# Patient Record
Sex: FEMALE | Race: WHITE | URBAN - NONMETROPOLITAN AREA
[De-identification: names, ages, dates, MRNs, and addresses within clinical notes are randomized per-mention and may not be internally consistent; named-entity substitution may affect disease eponyms.]

---

## 2017-11-29 ENCOUNTER — DOCTOR'S OFFICE (OUTPATIENT)
Dept: URBAN - NONMETROPOLITAN AREA CLINIC 1 | Facility: CLINIC | Age: 62
Setting detail: OPHTHALMOLOGY
End: 2017-11-29
Payer: COMMERCIAL

## 2017-11-29 DIAGNOSIS — E11.9: ICD-10-CM

## 2017-11-29 DIAGNOSIS — H25.013: ICD-10-CM

## 2017-11-29 PROCEDURE — 92014 COMPRE OPH EXAM EST PT 1/>: CPT | Performed by: OPHTHALMOLOGY

## 2017-11-29 PROCEDURE — 92134 CPTRZ OPH DX IMG PST SGM RTA: CPT | Performed by: OPHTHALMOLOGY

## 2017-11-29 ASSESSMENT — REFRACTION_AUTOREFRACTION
OS_CYLINDER: -1.00
OD_CYLINDER: -0.75
OS_SPHERE: +1.25
OS_AXIS: 178
OD_AXIS: 80
OD_SPHERE: +1.50

## 2017-11-29 ASSESSMENT — REFRACTION_CURRENTRX
OS_ADD: +2.00
OS_VPRISM_DIRECTION: BF
OS_CYLINDER: -0.50
OD_VPRISM_DIRECTION: BF
OD_ADD: +2.00
OD_OVR_VA: 20/
OS_OVR_VA: 20/
OS_AXIS: 179
OD_CYLINDER: 0.00
OD_SPHERE: +0.75
OS_SPHERE: +1.00
OD_AXIS: 180
OS_OVR_VA: 20/
OD_OVR_VA: 20/
OD_OVR_VA: 20/
OS_OVR_VA: 20/

## 2017-11-29 ASSESSMENT — VISUAL ACUITY
OD_BCVA: 20/25+2
OS_BCVA: 20/25+1

## 2017-11-29 ASSESSMENT — REFRACTION_MANIFEST
OU_VA: 20/
OD_VA2: 20/
OS_VA1: 20/
OS_VA3: 20/
OD_VA1: 20/
OS_VA3: 20/
OS_VA2: 20/
OD_VA2: 20/
OD_VA3: 20/
OD_VA1: 20/
OS_VA1: 20/
OD_VA2: 20/
OS_VA1: 20/
OU_VA: 20/
OD_VA3: 20/
OD_VA3: 20/
OS_VA2: 20/
OU_VA: 20/
OD_VA1: 20/
OS_VA2: 20/
OS_VA3: 20/

## 2017-11-29 ASSESSMENT — CONFRONTATIONAL VISUAL FIELD TEST (CVF)
OD_FINDINGS: FULL
OS_FINDINGS: FULL

## 2017-11-29 ASSESSMENT — SPHEQUIV_DERIVED
OS_SPHEQUIV: 0.75
OD_SPHEQUIV: 1.125

## 2019-02-06 ENCOUNTER — DOCTOR'S OFFICE (OUTPATIENT)
Dept: URBAN - NONMETROPOLITAN AREA CLINIC 1 | Facility: CLINIC | Age: 64
Setting detail: OPHTHALMOLOGY
End: 2019-02-06
Payer: COMMERCIAL

## 2019-02-06 DIAGNOSIS — E11.3293: ICD-10-CM

## 2019-02-06 DIAGNOSIS — H25.013: ICD-10-CM

## 2019-02-06 DIAGNOSIS — F17.200: ICD-10-CM

## 2019-02-06 PROCEDURE — 92134 CPTRZ OPH DX IMG PST SGM RTA: CPT | Performed by: OPHTHALMOLOGY

## 2019-02-06 PROCEDURE — 92012 INTRM OPH EXAM EST PATIENT: CPT | Performed by: OPHTHALMOLOGY

## 2019-02-06 ASSESSMENT — REFRACTION_MANIFEST
OS_VA1: 20/
OS_VA3: 20/
OD_VA2: 20/
OD_VA2: 20/
OU_VA: 20/
OU_VA: 20/
OS_VA2: 20/
OD_VA3: 20/
OD_VA1: 20/
OD_VA3: 20/
OS_VA2: 20/
OS_VA1: 20/
OD_VA1: 20/
OS_VA3: 20/

## 2019-02-06 ASSESSMENT — REFRACTION_CURRENTRX
OD_AXIS: 180
OS_SPHERE: +1.00
OD_OVR_VA: 20/
OS_AXIS: 002
OD_CYLINDER: 0.00
OD_OVR_VA: 20/
OS_CYLINDER: -0.50
OD_OVR_VA: 20/
OS_OVR_VA: 20/
OS_OVR_VA: 20/
OD_VPRISM_DIRECTION: BF
OS_ADD: +2.00
OD_SPHERE: +0.75
OS_OVR_VA: 20/
OS_VPRISM_DIRECTION: BF
OD_ADD: +2.00

## 2019-02-06 ASSESSMENT — VISUAL ACUITY
OD_BCVA: 20/25-1
OS_BCVA: 20/30-2

## 2019-02-06 ASSESSMENT — SPHEQUIV_DERIVED
OD_SPHEQUIV: 1.5
OS_SPHEQUIV: 0.75

## 2019-02-06 ASSESSMENT — REFRACTION_AUTOREFRACTION
OD_AXIS: 075
OD_SPHERE: +2.00
OS_AXIS: 179
OD_CYLINDER: -1.00
OS_SPHERE: +1.25
OS_CYLINDER: -1.00

## 2020-02-19 ENCOUNTER — DOCTOR'S OFFICE (OUTPATIENT)
Dept: URBAN - NONMETROPOLITAN AREA CLINIC 1 | Facility: CLINIC | Age: 65
Setting detail: OPHTHALMOLOGY
End: 2020-02-19
Payer: COMMERCIAL

## 2020-02-19 VITALS — HEIGHT: 55 IN

## 2020-02-19 DIAGNOSIS — H25.13: ICD-10-CM

## 2020-02-19 DIAGNOSIS — F17.200: ICD-10-CM

## 2020-02-19 DIAGNOSIS — H25.013: ICD-10-CM

## 2020-02-19 DIAGNOSIS — E11.9: ICD-10-CM

## 2020-02-19 PROCEDURE — 92014 COMPRE OPH EXAM EST PT 1/>: CPT | Performed by: OPHTHALMOLOGY

## 2020-02-19 ASSESSMENT — REFRACTION_CURRENTRX
OD_SPHERE: +0.75
OS_ADD: +2.00
OD_ADD: +1.75
OD_OVR_VA: 20/
OD_VPRISM_DIRECTION: BF
OS_AXIS: 177
OS_SPHERE: +1.00
OD_CYLINDER: 0.00
OS_CYLINDER: -0.50
OS_VPRISM_DIRECTION: BF
OS_OVR_VA: 20/
OD_AXIS: 180

## 2020-02-19 ASSESSMENT — REFRACTION_AUTOREFRACTION
OD_SPHERE: +2.50
OS_SPHERE: +0.75
OD_AXIS: 082
OD_CYLINDER: -1.75
OS_AXIS: 013
OS_CYLINDER: -0.50

## 2020-02-19 ASSESSMENT — VISUAL ACUITY
OS_BCVA: 20/25+2
OD_BCVA: 20/30+2

## 2020-02-19 ASSESSMENT — SPHEQUIV_DERIVED
OS_SPHEQUIV: 0.5
OD_SPHEQUIV: 1.625

## 2020-02-19 ASSESSMENT — CONFRONTATIONAL VISUAL FIELD TEST (CVF)
OS_FINDINGS: FULL
OD_FINDINGS: FULL

## 2021-08-13 ENCOUNTER — DOCTOR'S OFFICE (OUTPATIENT)
Dept: URBAN - NONMETROPOLITAN AREA CLINIC 1 | Facility: CLINIC | Age: 66
Setting detail: OPHTHALMOLOGY
End: 2021-08-13
Payer: COMMERCIAL

## 2021-08-13 ENCOUNTER — RX ONLY (RX ONLY)
Age: 66
End: 2021-08-13

## 2021-08-13 DIAGNOSIS — H25.013: ICD-10-CM

## 2021-08-13 DIAGNOSIS — E11.9: ICD-10-CM

## 2021-08-13 DIAGNOSIS — H25.13: ICD-10-CM

## 2021-08-13 DIAGNOSIS — F17.200: ICD-10-CM

## 2021-08-13 DIAGNOSIS — Z79.84: ICD-10-CM

## 2021-08-13 DIAGNOSIS — E11.3293: ICD-10-CM

## 2021-08-13 PROCEDURE — 92014 COMPRE OPH EXAM EST PT 1/>: CPT | Performed by: OPHTHALMOLOGY

## 2021-08-13 PROCEDURE — 92134 CPTRZ OPH DX IMG PST SGM RTA: CPT | Performed by: OPHTHALMOLOGY

## 2021-08-13 ASSESSMENT — REFRACTION_CURRENTRX
OS_AXIS: 177
OD_ADD: +1.75
OD_SPHERE: +0.75
OS_VPRISM_DIRECTION: BF
OD_VPRISM_DIRECTION: BF
OS_CYLINDER: -0.50
OS_SPHERE: +1.00
OS_ADD: +2.00
OS_OVR_VA: 20/
OD_CYLINDER: 0.00
OD_OVR_VA: 20/

## 2021-08-13 ASSESSMENT — CONFRONTATIONAL VISUAL FIELD TEST (CVF)
OD_FINDINGS: FULL
OS_FINDINGS: FULL

## 2021-08-13 ASSESSMENT — REFRACTION_AUTOREFRACTION
OS_SPHERE: +0.75
OS_CYLINDER: -0.50
OD_CYLINDER: -1.75
OS_AXIS: 013
OD_AXIS: 082
OD_SPHERE: +2.50

## 2021-08-13 ASSESSMENT — SPHEQUIV_DERIVED
OD_SPHEQUIV: 1.625
OS_SPHEQUIV: 0.5

## 2021-08-13 ASSESSMENT — VISUAL ACUITY
OS_BCVA: 20/25+2
OD_BCVA: 20/30-2

## 2021-12-28 DIAGNOSIS — Z11.59 SCREENING FOR VIRAL DISEASE: Primary | ICD-10-CM

## 2022-10-28 ENCOUNTER — DOCTOR'S OFFICE (OUTPATIENT)
Dept: URBAN - NONMETROPOLITAN AREA CLINIC 1 | Facility: CLINIC | Age: 67
Setting detail: OPHTHALMOLOGY
End: 2022-10-28
Payer: COMMERCIAL

## 2022-10-28 VITALS — HEIGHT: 55 IN

## 2022-10-28 DIAGNOSIS — E11.3293: ICD-10-CM

## 2022-10-28 DIAGNOSIS — H35.373: ICD-10-CM

## 2022-10-28 DIAGNOSIS — H25.013: ICD-10-CM

## 2022-10-28 DIAGNOSIS — Z79.84: ICD-10-CM

## 2022-10-28 DIAGNOSIS — H25.13: ICD-10-CM

## 2022-10-28 PROCEDURE — 92014 COMPRE OPH EXAM EST PT 1/>: CPT | Performed by: OPHTHALMOLOGY

## 2022-10-28 PROCEDURE — 92134 CPTRZ OPH DX IMG PST SGM RTA: CPT | Performed by: OPHTHALMOLOGY

## 2022-10-28 ASSESSMENT — REFRACTION_CURRENTRX
OS_OVR_VA: 20/
OD_OVR_VA: 20/
OS_ADD: +2.00
OD_SPHERE: +0.75
OS_AXIS: 177
OD_ADD: +1.75
OS_CYLINDER: -0.50
OS_SPHERE: +1.00
OS_VPRISM_DIRECTION: BF
OD_VPRISM_DIRECTION: BF
OD_CYLINDER: 0.00

## 2022-10-28 ASSESSMENT — REFRACTION_AUTOREFRACTION
OD_AXIS: 087
OS_SPHERE: +0.25
OD_SPHERE: +-1.50
OS_CYLINDER: 0.00
OS_AXIS: 000
OD_CYLINDER: -1.00

## 2022-10-28 ASSESSMENT — VISUAL ACUITY
OD_BCVA: 20/25-2
OS_BCVA: 20/20

## 2022-10-28 ASSESSMENT — SPHEQUIV_DERIVED: OS_SPHEQUIV: 0.25

## 2022-10-28 ASSESSMENT — CONFRONTATIONAL VISUAL FIELD TEST (CVF)
OS_FINDINGS: FULL
OD_FINDINGS: FULL

## 2023-10-11 ENCOUNTER — DOCTOR'S OFFICE (OUTPATIENT)
Dept: URBAN - NONMETROPOLITAN AREA CLINIC 1 | Facility: CLINIC | Age: 68
Setting detail: OPHTHALMOLOGY
End: 2023-10-11
Payer: COMMERCIAL

## 2023-10-11 DIAGNOSIS — E11.3293: ICD-10-CM

## 2023-10-11 DIAGNOSIS — E05.00: ICD-10-CM

## 2023-10-11 DIAGNOSIS — H25.013: ICD-10-CM

## 2023-10-11 DIAGNOSIS — H35.373: ICD-10-CM

## 2023-10-11 DIAGNOSIS — H50.10: ICD-10-CM

## 2023-10-11 DIAGNOSIS — H25.13: ICD-10-CM

## 2023-10-11 DIAGNOSIS — Z79.84: ICD-10-CM

## 2023-10-11 PROCEDURE — 92134 CPTRZ OPH DX IMG PST SGM RTA: CPT | Performed by: OPHTHALMOLOGY

## 2023-10-11 PROCEDURE — 99214 OFFICE O/P EST MOD 30 MIN: CPT | Performed by: OPHTHALMOLOGY

## 2023-10-11 ASSESSMENT — REFRACTION_CURRENTRX
OS_ADD: +2.00
OS_SPHERE: +1.00
OS_OVR_VA: 20/
OD_VPRISM_DIRECTION: BF
OD_OVR_VA: 20/
OS_CYLINDER: -0.50
OD_ADD: +1.75
OS_AXIS: 177
OS_VPRISM_DIRECTION: BF
OD_CYLINDER: 0.00
OD_SPHERE: +0.75

## 2023-10-11 ASSESSMENT — VISUAL ACUITY
OD_BCVA: 20/30
OS_BCVA: 20/25-1

## 2023-10-11 ASSESSMENT — SPHEQUIV_DERIVED: OS_SPHEQUIV: 0.25

## 2023-10-11 ASSESSMENT — REFRACTION_AUTOREFRACTION
OD_SPHERE: +-1.50
OS_CYLINDER: 0.00
OD_AXIS: 087
OS_AXIS: 000
OD_CYLINDER: -1.00
OS_SPHERE: +0.25

## 2023-10-11 ASSESSMENT — CONFRONTATIONAL VISUAL FIELD TEST (CVF)
OS_FINDINGS: FULL
OD_FINDINGS: FULL

## 2023-10-13 ENCOUNTER — DOCTOR'S OFFICE (OUTPATIENT)
Dept: URBAN - NONMETROPOLITAN AREA CLINIC 1 | Facility: CLINIC | Age: 68
Setting detail: OPHTHALMOLOGY
End: 2023-10-13
Payer: COMMERCIAL

## 2023-10-13 DIAGNOSIS — E05.00: ICD-10-CM

## 2023-10-13 PROBLEM — H50.10 EXOTROPIA, UNSPECIFIED: Status: ACTIVE | Noted: 2023-10-11

## 2023-10-13 PROCEDURE — NO CHARGE N/C PROFESSIONAL COURTESY: Performed by: OPHTHALMOLOGY

## 2023-10-13 ASSESSMENT — VISUAL ACUITY
OS_BCVA: 20/25-1
OD_BCVA: 20/30

## 2023-10-13 ASSESSMENT — REFRACTION_AUTOREFRACTION
OS_AXIS: 000
OS_SPHERE: +0.25
OS_CYLINDER: 0.00
OD_CYLINDER: -1.00
OD_SPHERE: +-1.50
OD_AXIS: 087

## 2023-10-13 ASSESSMENT — REFRACTION_CURRENTRX
OS_AXIS: 177
OD_VPRISM_DIRECTION: BF
OS_OVR_VA: 20/
OD_CYLINDER: 0.00
OS_VPRISM_DIRECTION: BF
OS_ADD: +2.00
OD_OVR_VA: 20/
OD_SPHERE: +0.75
OS_SPHERE: +1.00
OS_CYLINDER: -0.50
OD_ADD: +1.75

## 2023-10-13 ASSESSMENT — SPHEQUIV_DERIVED: OS_SPHEQUIV: 0.25

## 2023-11-08 ENCOUNTER — DOCTOR'S OFFICE (OUTPATIENT)
Dept: URBAN - NONMETROPOLITAN AREA CLINIC 1 | Facility: CLINIC | Age: 68
Setting detail: OPHTHALMOLOGY
End: 2023-11-08
Payer: COMMERCIAL

## 2023-11-08 VITALS — HEIGHT: 55 IN

## 2023-11-08 DIAGNOSIS — E05.00: ICD-10-CM

## 2023-11-08 PROCEDURE — 92083 EXTENDED VISUAL FIELD XM: CPT | Performed by: OPHTHALMOLOGY

## 2023-11-08 PROCEDURE — 99214 OFFICE O/P EST MOD 30 MIN: CPT | Performed by: OPHTHALMOLOGY

## 2023-11-08 ASSESSMENT — CONFRONTATIONAL VISUAL FIELD TEST (CVF)
OS_FINDINGS: FULL
OD_FINDINGS: FULL

## 2023-11-08 ASSESSMENT — VISUAL ACUITY
OS_BCVA: 20/25-1
OD_BCVA: 20/30

## 2023-11-08 ASSESSMENT — SPHEQUIV_DERIVED
OD_SPHEQUIV: 1.375
OS_SPHEQUIV: 0.75

## 2023-11-08 ASSESSMENT — REFRACTION_AUTOREFRACTION
OS_AXIS: 074
OD_AXIS: 164
OS_SPHERE: +0.50
OS_CYLINDER: +0.50
OD_SPHERE: +1.00
OD_CYLINDER: +0.75

## 2023-11-08 ASSESSMENT — REFRACTION_CURRENTRX
OS_CYLINDER: -0.50
OD_VPRISM_DIRECTION: BF
OD_ADD: +1.75
OS_ADD: +2.00
OS_OVR_VA: 20/
OD_OVR_VA: 20/
OD_CYLINDER: 0.00
OS_SPHERE: +1.00
OS_VPRISM_DIRECTION: BF
OD_AXIS: 180
OS_AXIS: 002
OD_SPHERE: +0.75

## 2024-06-24 ENCOUNTER — TELEPHONE (OUTPATIENT)
Age: 69
End: 2024-06-24

## 2024-06-24 NOTE — TELEPHONE ENCOUNTER
Caller: Laisha Dempsey    Doctor and/or Office: Dr. Harley/Angie    #: [phone]    Escalation: Appointment Patient would like very detailed directions to the office. Please return call. Thank you

## 2024-06-28 NOTE — TELEPHONE ENCOUNTER
Please see below message. Patient calling in again for detailed directions. I did let them know someone did try to call them but the mailbox was full. Please return call. Thank you

## 2024-07-02 NOTE — TELEPHONE ENCOUNTER
I called both numbers.  Home phone is not hers and goes to some one else.  Unable to leave message as vm if full.

## 2024-09-04 ENCOUNTER — DOCTOR'S OFFICE (OUTPATIENT)
Dept: URBAN - NONMETROPOLITAN AREA CLINIC 1 | Facility: CLINIC | Age: 69
Setting detail: OPHTHALMOLOGY
End: 2024-09-04
Payer: MEDICARE

## 2024-09-04 DIAGNOSIS — E05.00: ICD-10-CM

## 2024-09-04 PROCEDURE — 92083 EXTENDED VISUAL FIELD XM: CPT | Performed by: OPHTHALMOLOGY

## 2024-09-04 PROCEDURE — 99213 OFFICE O/P EST LOW 20 MIN: CPT | Performed by: OPHTHALMOLOGY

## 2024-09-04 ASSESSMENT — REFRACTION_AUTOREFRACTION
OD_CYLINDER: -0.75
OD_SPHERE: +2.00
OS_CYLINDER: 0.00
OS_SPHERE: +1.00
OD_AXIS: 045
OS_AXIS: 074

## 2024-09-04 ASSESSMENT — REFRACTION_CURRENTRX
OS_OVR_VA: 20/
OD_AXIS: 180
OD_SPHERE: +0.75
OS_VPRISM_DIRECTION: BF
OS_CYLINDER: -0.50
OS_AXIS: 178
OS_ADD: +2.00
OD_CYLINDER: 0.00
OD_OVR_VA: 20/
OS_SPHERE: +1.00
OD_VPRISM_DIRECTION: BF
OD_ADD: +1.75

## 2024-09-04 ASSESSMENT — VISUAL ACUITY
OS_BCVA: 20/25
OD_BCVA: 20/25

## 2024-09-04 ASSESSMENT — CONFRONTATIONAL VISUAL FIELD TEST (CVF)
OD_FINDINGS: FULL
OS_FINDINGS: FULL

## 2024-09-13 ENCOUNTER — DOCTOR'S OFFICE (OUTPATIENT)
Dept: URBAN - NONMETROPOLITAN AREA CLINIC 1 | Facility: CLINIC | Age: 69
Setting detail: OPHTHALMOLOGY
End: 2024-09-13
Payer: COMMERCIAL

## 2024-09-13 DIAGNOSIS — H52.4: ICD-10-CM

## 2024-09-13 DIAGNOSIS — H52.03: ICD-10-CM

## 2024-09-13 PROCEDURE — S0621 ROUTINE OPHTHALMOLOGICAL EXA: HCPCS | Performed by: OPTOMETRIST

## 2024-09-13 ASSESSMENT — REFRACTION_CURRENTRX
OD_AXIS: 180
OS_AXIS: 178
OD_CYLINDER: 0.00
OD_SPHERE: +0.75
OS_CYLINDER: -0.50
OD_VPRISM_DIRECTION: BF
OD_ADD: +1.75
OS_SPHERE: +1.00
OS_ADD: +2.00
OS_VPRISM_DIRECTION: BF
OD_OVR_VA: 20/
OS_OVR_VA: 20/

## 2024-09-13 ASSESSMENT — VISUAL ACUITY
OD_BCVA: 20/25+2
OS_BCVA: 20/20-1

## 2024-09-13 ASSESSMENT — REFRACTION_MANIFEST
OS_SPHERE: +1.00
OD_VA2: 20/25+2
OD_ADD: +2.50
OS_CYLINDER: -0.50
OD_AXIS: 075
OS_VA2: 20/25+2
OD_CYLINDER: -0.50
OS_AXIS: 170
OS_ADD: +2.50
OD_SPHERE: +1.00
OD_VA1: 20/25+2
OS_VA1: 20/25+2

## 2024-09-13 ASSESSMENT — REFRACTION_AUTOREFRACTION
OS_SPHERE: +1.50
OD_CYLINDER: -0.75
OD_AXIS: 084
OS_AXIS: 169
OD_SPHERE: +1.50
OS_CYLINDER: -1.00

## 2024-09-13 ASSESSMENT — CONFRONTATIONAL VISUAL FIELD TEST (CVF)
OS_FINDINGS: FULL
OD_FINDINGS: FULL

## 2024-10-08 ENCOUNTER — OPTICAL OFFICE (OUTPATIENT)
Dept: URBAN - NONMETROPOLITAN AREA CLINIC 4 | Facility: CLINIC | Age: 69
Setting detail: OPHTHALMOLOGY
End: 2024-10-08

## 2024-10-08 DIAGNOSIS — H52.03: ICD-10-CM

## 2024-10-08 PROCEDURE — V2744 TINT PHOTOCHROMATIC LENS/ES: HCPCS | Mod: LT | Performed by: OPTOMETRIST

## 2024-10-08 PROCEDURE — V2784 LENS POLYCARB OR EQUAL: HCPCS | Mod: LT | Performed by: OPTOMETRIST

## 2024-10-08 PROCEDURE — V2784 LENS POLYCARB OR EQUAL: HCPCS | Performed by: OPTOMETRIST

## 2024-10-08 PROCEDURE — V2750 ANTI-REFLECTIVE COATING: HCPCS | Performed by: OPTOMETRIST

## 2024-10-08 PROCEDURE — V2750 ANTI-REFLECTIVE COATING: HCPCS | Mod: LT | Performed by: OPTOMETRIST

## 2024-10-08 PROCEDURE — V2020 VISION SVCS FRAMES PURCHASES: HCPCS | Performed by: OPTOMETRIST

## 2024-10-08 PROCEDURE — V2744 TINT PHOTOCHROMATIC LENS/ES: HCPCS | Performed by: OPTOMETRIST

## 2024-10-08 PROCEDURE — V2781 PROGRESSIVE LENS PER LENS: HCPCS | Performed by: OPTOMETRIST

## 2024-10-08 PROCEDURE — V2781 PROGRESSIVE LENS PER LENS: HCPCS | Mod: LT | Performed by: OPTOMETRIST

## 2024-10-14 ENCOUNTER — OFFICE VISIT (OUTPATIENT)
Dept: PODIATRY | Facility: CLINIC | Age: 69
End: 2024-10-14
Payer: COMMERCIAL

## 2024-10-14 VITALS
HEIGHT: 60 IN | WEIGHT: 179.8 LBS | OXYGEN SATURATION: 94 % | BODY MASS INDEX: 35.3 KG/M2 | TEMPERATURE: 98.3 F | HEART RATE: 84 BPM | SYSTOLIC BLOOD PRESSURE: 155 MMHG | DIASTOLIC BLOOD PRESSURE: 72 MMHG

## 2024-10-14 DIAGNOSIS — L85.3 XEROSIS CUTIS: ICD-10-CM

## 2024-10-14 DIAGNOSIS — I73.9 PERIPHERAL ARTERIAL DISEASE (HCC): ICD-10-CM

## 2024-10-14 DIAGNOSIS — B35.1 ONYCHOMYCOSIS: Primary | ICD-10-CM

## 2024-10-14 DIAGNOSIS — L84 CALLUS: ICD-10-CM

## 2024-10-14 PROCEDURE — 11055 PARING/CUTG B9 HYPRKER LES 1: CPT | Performed by: PODIATRIST

## 2024-10-14 PROCEDURE — 99203 OFFICE O/P NEW LOW 30 MIN: CPT | Performed by: PODIATRIST

## 2024-10-14 PROCEDURE — 11721 DEBRIDE NAIL 6 OR MORE: CPT | Performed by: PODIATRIST

## 2024-10-14 RX ORDER — ALENDRONATE SODIUM 10 MG/1
TABLET ORAL
COMMUNITY
Start: 2024-07-17

## 2024-10-14 RX ORDER — ATORVASTATIN CALCIUM 20 MG/1
TABLET, FILM COATED ORAL
COMMUNITY
Start: 2024-09-09

## 2024-10-14 RX ORDER — OMEGA-3S/DHA/EPA/FISH OIL/D3 300MG-1000
400 CAPSULE ORAL DAILY
COMMUNITY

## 2024-10-14 RX ORDER — AMMONIUM LACTATE 12 G/100G
CREAM TOPICAL AS NEEDED
Qty: 385 G | Refills: 3 | Status: SHIPPED | OUTPATIENT
Start: 2024-10-14

## 2024-10-14 NOTE — PROGRESS NOTES
Ambulatory Visit  Name: Laisha Dempsey      : 1955      MRN: [id number]  Encounter Provider: Gagan Harley DPM  Encounter Date: 10/14/2024   Encounter department: [facility] [place]    Assessment & Plan  Onychomycosis         Peripheral arterial disease (HCC)         Xerosis cutis    Orders:    ammonium lactate (LAC-HYDRIN) 12 % cream; Apply topically as needed for dry skin    Callus         - q9 findings for at risk foot care  -Rx in for ammonium lactate advised on us3  -Q9 findings for at risk footcare I did discuss that if her dry skin was not resolved in a month that she may likely have a component of athlete's foot as well  - Callus care and nail care provided as below  - Return next available for consideration of continued care      Procedure: All mycotic toenails were reduced and debrided in length, width, and girth using a nail nipper and dremel.  All hyperkeratotic skin lesion(s) were sharply pared with a scalpel with no bleeding or evidence of ulceration.  Patient tolerated procedure(s) well without complications.  84501, 52492, q9        History of Present Illness     Laisha Dempsey is a 68 y.o. female who presents evaluation and management of her jessie bilateral feet.  She has elongated painful toenails he cannot bend down and cut her self.  They caught her socks and shoes.  She does smoke but she is not diabetic.      Review of Systems   Constitutional:  Negative for chills and fever.   HENT:  Negative for ear pain and sore throat.    Eyes:  Negative for pain and visual disturbance.   Respiratory:  Negative for cough and shortness of breath.    Cardiovascular:  Negative for chest pain and palpitations.   Gastrointestinal:  Negative for abdominal pain and vomiting.   Genitourinary:  Negative for dysuria and hematuria.   Musculoskeletal:  Negative for arthralgias and back pain.   Skin:  Negative for color change and rash.   Neurological:  Negative for seizures and  syncope.   All other systems reviewed and are negative.          Objective     /72 (BP Location: Left arm, Patient Position: Sitting, Cuff Size: Standard)   Pulse 84   Temp 98.3 °F (36.8 °C) (Temporal)   Ht 5' (1.524 m)   Wt 81.6 kg (179 lb 12.8 oz)   SpO2 94%   BMI 35.11 kg/m²     Physical Exam  Skin:     Comments: Nails 1-5 bilaterally thickened elongated subungual debris's, there is decreased pedal pulses with the left DP being plus 1 out of 4 and the right PT being nonpalpable.  Decreased pedal pulses, decreased digital hair growth but not absent skin is shiny atrophic warm to cool proximal to distal with a visible varicosity      Callus on the distal aspect of the left third metatarsal head.